# Patient Record
Sex: FEMALE | Race: WHITE | NOT HISPANIC OR LATINO | Employment: PART TIME | URBAN - METROPOLITAN AREA
[De-identification: names, ages, dates, MRNs, and addresses within clinical notes are randomized per-mention and may not be internally consistent; named-entity substitution may affect disease eponyms.]

---

## 2025-07-31 ENCOUNTER — OFFICE VISIT (OUTPATIENT)
Dept: ORTHOPEDIC SURGERY | Facility: CLINIC | Age: 40
End: 2025-07-31
Payer: COMMERCIAL

## 2025-07-31 ENCOUNTER — HOSPITAL ENCOUNTER (OUTPATIENT)
Dept: RADIOLOGY | Facility: CLINIC | Age: 40
Discharge: HOME | End: 2025-07-31
Payer: COMMERCIAL

## 2025-07-31 DIAGNOSIS — M79.672 LEFT FOOT PAIN: ICD-10-CM

## 2025-07-31 DIAGNOSIS — M25.572 LEFT ANKLE PAIN, UNSPECIFIED CHRONICITY: ICD-10-CM

## 2025-07-31 PROCEDURE — 73630 X-RAY EXAM OF FOOT: CPT | Mod: LEFT SIDE | Performed by: ORTHOPAEDIC SURGERY

## 2025-07-31 PROCEDURE — L4361 PNEUMA/VAC WALK BOOT PRE OTS: HCPCS | Performed by: ORTHOPAEDIC SURGERY

## 2025-07-31 PROCEDURE — 73610 X-RAY EXAM OF ANKLE: CPT | Mod: LEFT SIDE | Performed by: ORTHOPAEDIC SURGERY

## 2025-07-31 PROCEDURE — 73610 X-RAY EXAM OF ANKLE: CPT | Mod: LT

## 2025-07-31 PROCEDURE — 73630 X-RAY EXAM OF FOOT: CPT | Mod: LT

## 2025-07-31 PROCEDURE — 99203 OFFICE O/P NEW LOW 30 MIN: CPT | Performed by: ORTHOPAEDIC SURGERY

## 2025-08-03 NOTE — PROGRESS NOTES
History of Present Illness  Chief Complaint   Patient presents with    Left Foot - Pain     Left foot/ ankle pain        Patient got wrapped up in the leash of her dog pulled her forward and resulted in pain to the left ankle and foot.  Patient has had difficulty bearing weight on it since this time.    Medical History[1]    Medication Documentation Review Audit       Reviewed by Belkis Browning MA (Medical Assistant) on 07/31/25 at 0816      Medication Order Taking? Sig Documenting Provider Last Dose Status            No Medications to Display                                   RX Allergies[2]    Social History     Socioeconomic History    Marital status: Single     Spouse name: Not on file    Number of children: Not on file    Years of education: Not on file    Highest education level: Not on file   Occupational History    Not on file   Tobacco Use    Smoking status: Unknown    Smokeless tobacco: Not on file   Substance and Sexual Activity    Alcohol use: Not on file    Drug use: Not on file    Sexual activity: Not on file   Other Topics Concern    Not on file   Social History Narrative    Not on file     Social Drivers of Health     Financial Resource Strain: Patient Declined (7/10/2025)    Received from [facility]    Overall Financial Resource Strain (CARDIA)     Difficulty of Paying Living Expenses: Patient declined   Food Insecurity: Patient Declined (7/10/2025)    Received from [facility]    Hunger Vital Sign     Within the past 12 months, you worried that your food would run out before you got the money to buy more.: Patient declined     Within the past 12 months, the food you bought just didn't last and you didn't have money to get more.: Patient declined   Transportation Needs: No Transportation Needs (7/10/2025)    Received from [facility]    PRAPARE - Transportation     Lack of Transportation (Medical): No     Lack of Transportation (Non-Medical): No   Physical Activity:  Sufficiently Active (7/10/2025)    Received from [facility]    Exercise Vital Sign     On average, how many days per week do you engage in moderate to strenuous exercise (like a brisk walk)?: 5 days     On average, how many minutes do you engage in exercise at this level?: 30 min   Stress: No Stress Concern Present (7/10/2025)    Received from [facility] [place] [facility] - Occupational Stress Questionnaire     Feeling of Stress : Only a little   Social Connections: Unknown (7/10/2025)    Received from [facility]    Social Connection and Isolation Panel     In a typical week, how many times do you talk on the phone with family, friends, or neighbors?: Three times a week     How often do you get together with friends or relatives?: Once a week     How often do you attend Quaker or Protestant services?: Patient declined     Do you belong to any clubs or organizations such as Quaker groups, unions, fraternal or athletic groups, or school groups?: No     How often do you attend meetings of the clubs or organizations you belong to?: Patient declined     Are you , , , , never , or living with a partner?: Living with partner   Intimate Partner Violence: Not on file   Housing Stability: Low Risk  (12/1/2022)    Received from [facility]    Housing Stability Vital Sign     Unable to Pay for Housing in the Last Year: No     Number of Places Lived in the Last Year: 1     Unstable Housing in the Last Year: No       Surgical History[3]         Review of Systems   GENERAL: Negative for malaise, significant weight loss, fever  MUSCULOSKELETAL: see HPI  NEURO:  Negative      Exam  Left lower extremity:    Skin: Intact circumferentially no edema or ecchymosis appreciated.    Neuro: Sensation is intact to light touch in the superficial peroneal, deep peroneal, tibial nerve distribution.  4 out of 5 strength in dorsiflexion at the ankle,  plantarflexion at the ankle, extension of the big toe.     MSK: Patient is tender palpation about the anterior lateral aspect of the ankle.  Nontender palpation about the peroneal tendons.  Negative heel squeeze, negative syndesmotic squeeze, no pain with abduction of the forefoot.  Tolerates some range of motion of the ankle.     Imaging  XR foot left 3+ views  Narrative: Interpreted By:  Jonathon Hutton,   STUDY:  XR FOOT LEFT 3+ VIEWS; ;  7/31/2025 8:35 am      INDICATION:  Signs/Symptoms:pain.      ,M79.672 Pain in left foot,M25.572 Pain in left ankle and joints of  left foot      COMPARISON:  None.      ACCESSION NUMBER(S):  [id number]      ORDERING CLINICIAN:  JONATHON HUTTON      FINDINGS:  AP, oblique and lateral imaging of the left foot demonstrates plantar  heel spur but otherwise normal radiographic appearing left foot      Impression: Plantar heel spur with otherwise normal radiographic appearing left  foot          MACRO:  None      Signed by: Jonathon Hutton 7/31/2025 6:07 PM  Dictation workstation:   [id number]  XR ankle left 3+ views  Narrative: Interpreted By:  Jonathon Hutton,   STUDY:  XR ANKLE LEFT 3+ VIEWS; ;  7/31/2025 8:35 am      INDICATION:  Signs/Symptoms:pain.      ,M79.672 Pain in left foot,M25.572 Pain in left ankle and joints of  left foot      COMPARISON:  None.      ACCESSION NUMBER(S):  [id number]      ORDERING CLINICIAN:  JONATHON HUTTON      FINDINGS:  AP, mortise and lateral imaging left ankle does not demonstrate any  evidence of fracture nor dislocation with fairly large plantar heel  spur present on lateral imaging.      Impression: Normal left ankle with associated plantar heel spur          MACRO:  None      Signed by: Jonathon Hutton 7/31/2025 6:06 PM  Dictation workstation:   [id number]         Assessment  Left ankle/foot sprain     Plan  Patient stained a sprain of the left ankle foot as a result of twisting and contusion injury from the incident where she fell as a result  of being pulled by the leash.  Recommend cam boot immobilization to allow for safer ambulation as she is currently struggling with ambulation this should provide additional compression as well.  Patient was recommended to utilize anti-inflammatories scheduled over the next week in the form of ibuprofen as well as Tylenol and instructed to elevate and ice often.  She discontinue use of the cam walking boot over the next several weeks as symptoms improved.  And should return to normal shoewear at that time.  Patient should follow-up in 1 month for repeat clinical assessment.          [1] History reviewed. No pertinent past medical history.  [2]   Allergies  Allergen Reactions    Clobetasol Hives and Shortness of breath     Chest pain, heart palpitations    Doxycycline Monohydrate Hives     Vomiting and diarrhea    Hydroxychloroquine GI Upset     GI upset    Sulfamethoxazole-Trimethoprim Nausea/vomiting    Adhesive Rash    Latex Rash    Silicone Rash   [3] History reviewed. No pertinent surgical history.

## 2025-08-26 ENCOUNTER — APPOINTMENT (OUTPATIENT)
Dept: ORTHOPEDIC SURGERY | Facility: CLINIC | Age: 40
End: 2025-08-26
Payer: COMMERCIAL

## 2025-08-26 ENCOUNTER — OFFICE VISIT (OUTPATIENT)
Dept: ORTHOPEDIC SURGERY | Facility: CLINIC | Age: 40
End: 2025-08-26
Payer: COMMERCIAL

## 2025-08-26 ENCOUNTER — HOSPITAL ENCOUNTER (OUTPATIENT)
Dept: RADIOLOGY | Facility: HOSPITAL | Age: 40
Discharge: HOME | End: 2025-08-26
Payer: COMMERCIAL

## 2025-08-26 DIAGNOSIS — M54.50 LUMBAR PAIN: ICD-10-CM

## 2025-08-26 PROCEDURE — 72110 X-RAY EXAM L-2 SPINE 4/>VWS: CPT | Performed by: RADIOLOGY

## 2025-08-26 PROCEDURE — 99213 OFFICE O/P EST LOW 20 MIN: CPT | Performed by: ORTHOPAEDIC SURGERY

## 2025-08-26 PROCEDURE — 72110 X-RAY EXAM L-2 SPINE 4/>VWS: CPT

## 2025-08-26 RX ORDER — DEXTROAMPHETAMINE SACCHARATE, AMPHETAMINE ASPARTATE, DEXTROAMPHETAMINE SULFATE AND AMPHETAMINE SULFATE 3.75; 3.75; 3.75; 3.75 MG/1; MG/1; MG/1; MG/1
1 TABLET ORAL
COMMUNITY
Start: 2024-12-09

## 2025-08-26 RX ORDER — ERGOCALCIFEROL 1.25 MG/1
50000 CAPSULE ORAL WEEKLY
COMMUNITY
Start: 2025-07-10